# Patient Record
(demographics unavailable — no encounter records)

---

## 2025-01-16 NOTE — HISTORY OF PRESENT ILLNESS
[FreeTextEntry1] : Max Gonzalez is a 50-year-old male, with a PMHx significant for HTN, HLD, DM (last A1c 10.4), and IBS, who presents today for cardiac evaluation. Patient complains of chest pain that is substernal in nature with radiation to the left arm. Pain has been progressive over the last several weeks. Additionally, he endorses SOB with exertion that is relieved with rest, as well as orthopnea, PND, and LE edema.

## 2025-01-16 NOTE — CARDIOLOGY SUMMARY
[de-identified] : 01/14/2025: NSR, (+) Nonspecific ST-T wave changes. =======================================================

## 2025-01-16 NOTE — REVIEW OF SYSTEMS
[Lower Ext Edema] : no extremity edema [Leg Claudication] : no intermittent leg claudication [Palpitations] : no palpitations [Syncope] : no syncope [Cough] : no cough [FreeTextEntry5] : (+) Chest Pain, (+) SOB on Exertion, (+) Orthopnea, (+) PND, (+) LE Edema [FreeTextEntry6] : (+) SOB on Exertion

## 2025-01-16 NOTE — CARDIOLOGY SUMMARY
[de-identified] : 01/14/2025: NSR, (+) Nonspecific ST-T wave changes. =======================================================

## 2025-01-16 NOTE — DISCUSSION/SUMMARY
[FreeTextEntry1] : EKG performed today revealed SR, (+) Nonspecific ST-T wave changes.  SOB/Orthopnea/PND/LE Edema: Recommend an echocardiogram to evaluate LV function and rule out CHF.  Chest Pain: Chest pain is concerning for anginal equivalent given nonspecific ST-T wave changes and uncontrolled DM. Recommend a Coronary CTA to delineate coronary anatomy.   HTN: The impression is hypertension. BP today is 136/100 mmHg. There are no changes in medication management. Continue current medical therapy. Other planned treatments include an exercise regimen, weight loss, low sodium diet, and alcohol moderation.  HLD: The impression is hyperlipidemia. There are no changes in medication management. Continue current medical therapy. Other planned treatment includes diet modification, exercise, and weight loss.  DM: The impression is diabetes mellitus. Currently, the condition is uncontrolled. Last A1c was 10.4. There are no changes in medication management. Patient advised to continue taking medications as prescribed, closely monitor blood sugar at home, follow a diabetic diet, exercise, lose weight, and follow up for continued monitoring. Discussed importance of diet and exercise to improve glycemic control. Additionally, recommend following up with Gianna Lazo DNP, for GLP-1 agonist.   Instructed to follow up after testing is complete.  Plan was discussed with the patient.

## 2025-03-03 NOTE — REVIEW OF SYSTEMS
[Negative] : Heme/Lymph [Lower Ext Edema] : no extremity edema [Leg Claudication] : no intermittent leg claudication [Palpitations] : no palpitations [Syncope] : no syncope [Cough] : no cough [FreeTextEntry5] : (+) Chest Pain, (+) SOB on Exertion, (+) Orthopnea, (+) PND, (+) LE Edema [FreeTextEntry6] : (+) SOB on Exertion

## 2025-03-03 NOTE — CARDIOLOGY SUMMARY
[de-identified] : 01/14/2025: NSR, (+) Nonspecific ST-T wave changes. =======================================================

## 2025-03-03 NOTE — HISTORY OF PRESENT ILLNESS
[FreeTextEntry1] : Max Gonzalez is a 50-year-old male, with a PMHx significant for HTN, HLD, DM (last A1c 10.4), and IBS, who presents today for cardiac evaluation. Patient complains of chest pain that is substernal in nature with radiation to the left arm. Pain has been progressive over the last several weeks. Additionally, he endorses SOB with exertion that is relieved with rest, as well as orthopnea, PND, and LE edema.   dm on janumet a1c 7.8 wants glp1 soda, carbs, fats

## 2025-03-03 NOTE — PHYSICAL EXAM
[Well Developed] : well developed [Well Nourished] : well nourished [No Acute Distress] : no acute distress [Normal Conjunctiva] : normal conjunctiva [Normal Venous Pressure] : normal venous pressure [No Carotid Bruit] : no carotid bruit [Normal S1, S2] : normal S1, S2 [No Murmur] : no murmur [No Rub] : no rub [No Gallop] : no gallop [Clear Lung Fields] : clear lung fields [Good Air Entry] : good air entry [No Respiratory Distress] : no respiratory distress  [Soft] : abdomen soft [Non Tender] : non-tender [No Masses/organomegaly] : no masses/organomegaly [Normal Bowel Sounds] : normal bowel sounds [Normal Gait] : normal gait [No Cyanosis] : no cyanosis [No Clubbing] : no clubbing [No Varicosities] : no varicosities [Normal PT B/L] : normal PT B/L [Normal DP B/L] : normal DP B/L [No Rash] : no rash [No Skin Lesions] : no skin lesions [Moves all extremities] : moves all extremities [No Focal Deficits] : no focal deficits [Normal Speech] : normal speech [Alert and Oriented] : alert and oriented [Normal memory] : normal memory [de-identified] : (+) 1+ Pitting Edema of the B/L LEs

## 2025-03-11 NOTE — CARDIOLOGY SUMMARY
[de-identified] : 01/14/2025: NSR, (+) Nonspecific ST-T wave changes. =======================================================

## 2025-03-11 NOTE — HISTORY OF PRESENT ILLNESS
[FreeTextEntry1] : Max Gonzalez is a 50-year-old male, with a PMHx significant for HTN, HLD, DM, IBS  Pt presents for risk factor management. He has dm on janumet   a1c 7.8 wants glp1 soda, carbs, fats

## 2025-03-11 NOTE — PHYSICAL EXAM
[Well Developed] : well developed [Well Nourished] : well nourished [No Acute Distress] : no acute distress [Normal Conjunctiva] : normal conjunctiva [Normal Venous Pressure] : normal venous pressure [No Carotid Bruit] : no carotid bruit [Normal S1, S2] : normal S1, S2 [No Murmur] : no murmur [No Rub] : no rub [No Gallop] : no gallop [Clear Lung Fields] : clear lung fields [Good Air Entry] : good air entry [No Respiratory Distress] : no respiratory distress  [Soft] : abdomen soft [Non Tender] : non-tender [No Masses/organomegaly] : no masses/organomegaly [Normal Bowel Sounds] : normal bowel sounds [Normal Gait] : normal gait [No Cyanosis] : no cyanosis [No Clubbing] : no clubbing [No Varicosities] : no varicosities [Normal PT B/L] : normal PT B/L [Normal DP B/L] : normal DP B/L [No Rash] : no rash [No Skin Lesions] : no skin lesions [Moves all extremities] : moves all extremities [No Focal Deficits] : no focal deficits [Normal Speech] : normal speech [Alert and Oriented] : alert and oriented [Normal memory] : normal memory [de-identified] : (+) 1+ Pitting Edema of the B/L LEs

## 2025-03-11 NOTE — REASON FOR VISIT
[Other: ____] : [unfilled] [CV Risk Factors and Non-Cardiac Disease] : CV risk factors and non-cardiac disease

## 2025-03-11 NOTE — CARDIOLOGY SUMMARY
[de-identified] : 01/14/2025: NSR, (+) Nonspecific ST-T wave changes. =======================================================

## 2025-03-11 NOTE — PHYSICAL EXAM
[Well Developed] : well developed [Well Nourished] : well nourished [No Acute Distress] : no acute distress [Normal Conjunctiva] : normal conjunctiva [Normal Venous Pressure] : normal venous pressure [No Carotid Bruit] : no carotid bruit [Normal S1, S2] : normal S1, S2 [No Murmur] : no murmur [No Rub] : no rub [No Gallop] : no gallop [Clear Lung Fields] : clear lung fields [Good Air Entry] : good air entry [No Respiratory Distress] : no respiratory distress  [Soft] : abdomen soft [Non Tender] : non-tender [No Masses/organomegaly] : no masses/organomegaly [Normal Bowel Sounds] : normal bowel sounds [Normal Gait] : normal gait [No Cyanosis] : no cyanosis [No Clubbing] : no clubbing [No Varicosities] : no varicosities [Normal PT B/L] : normal PT B/L [Normal DP B/L] : normal DP B/L [No Rash] : no rash [No Skin Lesions] : no skin lesions [Moves all extremities] : moves all extremities [No Focal Deficits] : no focal deficits [Normal Speech] : normal speech [Alert and Oriented] : alert and oriented [Normal memory] : normal memory [de-identified] : (+) 1+ Pitting Edema of the B/L LEs

## 2025-03-11 NOTE — CARDIOLOGY SUMMARY
[de-identified] : 01/14/2025: NSR, (+) Nonspecific ST-T wave changes. =======================================================

## 2025-03-11 NOTE — PHYSICAL EXAM
[Well Developed] : well developed [Well Nourished] : well nourished [No Acute Distress] : no acute distress [Normal Conjunctiva] : normal conjunctiva [Normal Venous Pressure] : normal venous pressure [No Carotid Bruit] : no carotid bruit [Normal S1, S2] : normal S1, S2 [No Murmur] : no murmur [No Rub] : no rub [No Gallop] : no gallop [Clear Lung Fields] : clear lung fields [Good Air Entry] : good air entry [No Respiratory Distress] : no respiratory distress  [Soft] : abdomen soft [Non Tender] : non-tender [No Masses/organomegaly] : no masses/organomegaly [Normal Bowel Sounds] : normal bowel sounds [Normal Gait] : normal gait [No Cyanosis] : no cyanosis [No Clubbing] : no clubbing [No Varicosities] : no varicosities [Normal PT B/L] : normal PT B/L [Normal DP B/L] : normal DP B/L [No Rash] : no rash [No Skin Lesions] : no skin lesions [Moves all extremities] : moves all extremities [No Focal Deficits] : no focal deficits [Normal Speech] : normal speech [Alert and Oriented] : alert and oriented [Normal memory] : normal memory [de-identified] : (+) 1+ Pitting Edema of the B/L LEs Stable

## 2025-03-11 NOTE — HISTORY OF PRESENT ILLNESS
Attempted to lm for patient, call dropped   [FreeTextEntry1] : Max Gonzalez is a 50-year-old male, with a PMHx significant for HTN, HLD, DM, IBS  Pt presents for risk factor management. He has dm on janumet   a1c 7.8 wants glp1 soda, carbs, fats